# Patient Record
(demographics unavailable — no encounter records)

---

## 2025-04-21 NOTE — HISTORY OF PRESENT ILLNESS
[FreeTextEntry1] : Initial Evaluation CPE after [place] [de-identified] : 45 M PMHx HLD here to establish care. HLD -- TC in low 200s. Calcium score was 0 but past cardiologist still recommended rosuvastatin. Tried 10mg then cut down to 5mg due to robust response. Has not checked lipids since decreasing to 5mg. Did colonoscopy at [facility] a few months ago -- return in 5 years. Bloating ever since recurrent antibiotics for gastroenteritis in [place]. Diet -- mostly optimized fiber and sat fat intake Exercise -- weights and walking; no moderate intensity FHx -- dad CVA late 50s  at 61; brother also HLD; mother healthy SHx -- none

## 2025-04-21 NOTE — PHYSICAL EXAM
[No Acute Distress] : no acute distress [Normal Sclera/Conjunctiva] : normal sclera/conjunctiva [No Respiratory Distress] : no respiratory distress  [No Focal Deficits] : no focal deficits [Normal] : affect was normal and insight and judgment were intact

## 2025-07-24 NOTE — REVIEW OF SYSTEMS
[Abdominal Pain] : no abdominal pain [Nausea] : no nausea [Vomiting] : no vomiting [Heartburn] : heartburn [Negative] : Psychiatric

## 2025-07-24 NOTE — PHYSICAL EXAM
[No Acute Distress] : no acute distress [Normal Sclera/Conjunctiva] : normal sclera/conjunctiva [Normal Outer Ear/Nose] : the outer ears and nose were normal in appearance [No Carotid Bruits] : no carotid bruits [No Edema] : there was no peripheral edema [No Extremity Clubbing/Cyanosis] : no extremity clubbing/cyanosis [No Spinal Tenderness] : no spinal tenderness [No Focal Deficits] : no focal deficits [Normal] : affect was normal and insight and judgment were intact

## 2025-07-24 NOTE — HISTORY OF PRESENT ILLNESS
[FreeTextEntry1] : Annual physical [de-identified] : 46 M PMHx HLD here for CPE. Had long trip through [place] and [place] and just came back. Had lp(a) tested in [place] and they said it was normal. 3.9 mg/dl (less than 30) Still on 10mg rosuvastatin. Had some appropriate dietary indiscretion while on vacation. Getting wisdom tooth removed soon. Getting slight indigestion and LUQ pain in evenings after eating spicy food. Able to ignore it.

## 2025-07-24 NOTE — HEALTH RISK ASSESSMENT
[Yes] : Yes [2 - 4 times a month (2 pts)] : 2-4 times a month (2 points) [3 or 4 (1 pt)] : 3 or 4  (1 point) [Less than monthly (1 pt)] : Less than monthly (1 point) [No] : In the past 12 months have you used drugs other than those required for medical reasons? No [No falls in past year] : Patient reported no falls in the past year [0] : 2) Feeling down, depressed, or hopeless: Not at all (0) [PHQ-2 Negative - No further assessment needed] : PHQ-2 Negative - No further assessment needed [Never] : Never [HIV Test offered] : HIV Test offered [Hepatitis C test offered] : Hepatitis C test offered [With Significant Other] : lives with significant other [Employed] : employed [College] : College [Significant Other] : lives with significant other [Sexually Active] : sexually active [Feels Safe at Home] : Feels safe at home [Fully functional (bathing, dressing, toileting, transferring, walking, feeding)] : Fully functional (bathing, dressing, toileting, transferring, walking, feeding) [Fully functional (using the telephone, shopping, preparing meals, housekeeping, doing laundry, using] : Fully functional and needs no help or supervision to perform IADLs (using the telephone, shopping, preparing meals, housekeeping, doing laundry, using transportation, managing medications and managing finances) [Reports normal functional visual acuity (ie: able to read med bottle)] : Reports normal functional visual acuity [Audit-CScore] : 4 [de-identified] : 3-4/week 1 hour of mixed weights and cardio. Treadmill. Also walking [de-identified] : oatmeal/berries/yogurt, light salad with animal protein, dinner usually fish.  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